# Patient Record
Sex: MALE | Race: WHITE | Employment: OTHER | URBAN - METROPOLITAN AREA
[De-identification: names, ages, dates, MRNs, and addresses within clinical notes are randomized per-mention and may not be internally consistent; named-entity substitution may affect disease eponyms.]

---

## 2018-01-01 ENCOUNTER — HOSPITAL ENCOUNTER (INPATIENT)
Age: 78
LOS: 1 days | Discharge: HOME | DRG: 292 | End: 2018-11-22
Attending: INTERNAL MEDICINE | Admitting: INTERNAL MEDICINE
Payer: MEDICARE

## 2018-01-01 ENCOUNTER — APPOINTMENT (OUTPATIENT)
Dept: GENERAL RADIOLOGY | Age: 78
DRG: 292 | End: 2018-01-01
Attending: INTERNAL MEDICINE
Payer: MEDICARE

## 2018-01-01 ENCOUNTER — HOSPITAL ENCOUNTER (OUTPATIENT)
Dept: LAB | Age: 78
Discharge: HOME | End: 2018-11-28
Payer: MEDICARE

## 2018-01-01 VITALS
DIASTOLIC BLOOD PRESSURE: 74 MMHG | WEIGHT: 168.2 LBS | HEART RATE: 99 BPM | RESPIRATION RATE: 16 BRPM | BODY MASS INDEX: 23.46 KG/M2 | SYSTOLIC BLOOD PRESSURE: 125 MMHG | TEMPERATURE: 98.7 F | OXYGEN SATURATION: 95 %

## 2018-01-01 DIAGNOSIS — I50.23 ACUTE ON CHRONIC SYSTOLIC HEART FAILURE (HCC): ICD-10-CM

## 2018-01-01 LAB
ALBUMIN SERPL-MCNC: 3.8 G/DL (ref 3.2–4.6)
ALBUMIN/GLOB SERPL: 1 {RATIO} (ref 1.2–3.5)
ALP SERPL-CCNC: 128 U/L (ref 50–136)
ALT SERPL-CCNC: 29 U/L (ref 12–65)
ANION GAP SERPL CALC-SCNC: 5 MMOL/L
ANION GAP SERPL CALC-SCNC: 6 MMOL/L (ref 7–16)
ANION GAP SERPL CALC-SCNC: 8 MMOL/L (ref 7–16)
AST SERPL-CCNC: 41 U/L (ref 15–37)
ATRIAL RATE: 182 BPM
BILIRUB SERPL-MCNC: 1.7 MG/DL (ref 0.2–1.1)
BNP SERPL-MCNC: 261 PG/ML
BUN SERPL-MCNC: 17 MG/DL (ref 8–23)
BUN SERPL-MCNC: 17 MG/DL (ref 8–23)
BUN SERPL-MCNC: 18 MG/DL (ref 8–23)
CALCIUM SERPL-MCNC: 8.3 MG/DL (ref 8.3–10.4)
CALCIUM SERPL-MCNC: 8.8 MG/DL (ref 8.3–10.4)
CALCIUM SERPL-MCNC: 9 MG/DL (ref 8.3–10.4)
CALCULATED R AXIS, ECG10: 81 DEGREES
CALCULATED T AXIS, ECG11: 77 DEGREES
CHLORIDE SERPL-SCNC: 103 MMOL/L (ref 98–107)
CHLORIDE SERPL-SCNC: 104 MMOL/L (ref 98–107)
CHLORIDE SERPL-SCNC: 107 MMOL/L (ref 98–107)
CO2 SERPL-SCNC: 30 MMOL/L (ref 21–32)
CO2 SERPL-SCNC: 30 MMOL/L (ref 21–32)
CO2 SERPL-SCNC: 32 MMOL/L (ref 21–32)
CREAT SERPL-MCNC: 1.23 MG/DL (ref 0.8–1.5)
CREAT SERPL-MCNC: 1.28 MG/DL (ref 0.8–1.5)
CREAT SERPL-MCNC: 1.3 MG/DL (ref 0.8–1.5)
DIAGNOSIS, 93000: NORMAL
ERYTHROCYTE [DISTWIDTH] IN BLOOD BY AUTOMATED COUNT: 17.2 %
ERYTHROCYTE [DISTWIDTH] IN BLOOD BY AUTOMATED COUNT: 18.3 %
GLOBULIN SER CALC-MCNC: 4 G/DL (ref 2.3–3.5)
GLUCOSE SERPL-MCNC: 103 MG/DL (ref 65–100)
GLUCOSE SERPL-MCNC: 105 MG/DL (ref 65–100)
GLUCOSE SERPL-MCNC: 94 MG/DL (ref 65–100)
HCT VFR BLD AUTO: 45.8 % (ref 41.1–50.3)
HCT VFR BLD AUTO: 51.6 % (ref 41.1–50.3)
HGB BLD-MCNC: 14.4 G/DL (ref 13.6–17.2)
HGB BLD-MCNC: 16.5 G/DL (ref 13.6–17.2)
INR PPP: 3.8
INR PPP: 3.9
MAGNESIUM SERPL-MCNC: 2 MG/DL (ref 1.8–2.4)
MAGNESIUM SERPL-MCNC: 2.1 MG/DL (ref 1.8–2.4)
MAGNESIUM SERPL-MCNC: 2.2 MG/DL (ref 1.8–2.4)
MCH RBC QN AUTO: 27.6 PG (ref 26.1–32.9)
MCH RBC QN AUTO: 28.3 PG (ref 26.1–32.9)
MCHC RBC AUTO-ENTMCNC: 31.4 G/DL (ref 31.4–35)
MCHC RBC AUTO-ENTMCNC: 32 G/DL (ref 31.4–35)
MCV RBC AUTO: 87.9 FL (ref 79.6–97.8)
MCV RBC AUTO: 88.4 FL (ref 79.6–97.8)
NRBC # BLD: 0 K/UL (ref 0–0.2)
NRBC # BLD: 0 K/UL (ref 0–0.2)
PLATELET # BLD AUTO: 107 K/UL (ref 150–450)
PLATELET # BLD AUTO: 131 K/UL (ref 150–450)
PMV BLD AUTO: 9.8 FL (ref 9.4–12.3)
PMV BLD AUTO: 9.9 FL (ref 9.4–12.3)
POTASSIUM SERPL-SCNC: 3.7 MMOL/L (ref 3.5–5.1)
POTASSIUM SERPL-SCNC: 3.9 MMOL/L (ref 3.5–5.1)
POTASSIUM SERPL-SCNC: 4.5 MMOL/L (ref 3.5–5.1)
PROT SERPL-MCNC: 7.8 G/DL (ref 6.3–8.2)
PROTHROMBIN TIME: 35.7 SEC (ref 11.5–14.5)
PROTHROMBIN TIME: 36.3 SEC (ref 11.5–14.5)
Q-T INTERVAL, ECG07: 420 MS
QRS DURATION, ECG06: 100 MS
QTC CALCULATION (BEZET), ECG08: 502 MS
RBC # BLD AUTO: 5.21 M/UL (ref 4.23–5.6)
RBC # BLD AUTO: 5.84 M/UL (ref 4.23–5.6)
SODIUM SERPL-SCNC: 140 MMOL/L (ref 136–145)
SODIUM SERPL-SCNC: 142 MMOL/L (ref 136–145)
SODIUM SERPL-SCNC: 143 MMOL/L (ref 136–145)
TROPONIN I SERPL-MCNC: 0.02 NG/ML (ref 0.02–0.05)
VENTRICULAR RATE, ECG03: 86 BPM
WBC # BLD AUTO: 5 K/UL (ref 4.3–11.1)
WBC # BLD AUTO: 5.2 K/UL (ref 4.3–11.1)

## 2018-01-01 PROCEDURE — 74011250637 HC RX REV CODE- 250/637: Performed by: INTERNAL MEDICINE

## 2018-01-01 PROCEDURE — 65660000000 HC RM CCU STEPDOWN

## 2018-01-01 PROCEDURE — 74011250637 HC RX REV CODE- 250/637: Performed by: NURSE PRACTITIONER

## 2018-01-01 PROCEDURE — 74011250636 HC RX REV CODE- 250/636: Performed by: NURSE PRACTITIONER

## 2018-01-01 PROCEDURE — 85027 COMPLETE CBC AUTOMATED: CPT

## 2018-01-01 PROCEDURE — 71045 X-RAY EXAM CHEST 1 VIEW: CPT

## 2018-01-01 PROCEDURE — 83880 ASSAY OF NATRIURETIC PEPTIDE: CPT

## 2018-01-01 PROCEDURE — 83735 ASSAY OF MAGNESIUM: CPT

## 2018-01-01 PROCEDURE — 80053 COMPREHEN METABOLIC PANEL: CPT

## 2018-01-01 PROCEDURE — 85610 PROTHROMBIN TIME: CPT

## 2018-01-01 PROCEDURE — 36415 COLL VENOUS BLD VENIPUNCTURE: CPT

## 2018-01-01 PROCEDURE — 80048 BASIC METABOLIC PNL TOTAL CA: CPT

## 2018-01-01 PROCEDURE — 77010033678 HC OXYGEN DAILY

## 2018-01-01 PROCEDURE — 94760 N-INVAS EAR/PLS OXIMETRY 1: CPT

## 2018-01-01 PROCEDURE — 84484 ASSAY OF TROPONIN QUANT: CPT

## 2018-01-01 PROCEDURE — 93005 ELECTROCARDIOGRAM TRACING: CPT | Performed by: NURSE PRACTITIONER

## 2018-01-01 RX ORDER — FUROSEMIDE 40 MG/1
40 TABLET ORAL DAILY
Status: DISCONTINUED | OUTPATIENT
Start: 2018-01-01 | End: 2018-01-01 | Stop reason: HOSPADM

## 2018-01-01 RX ORDER — WARFARIN SODIUM 5 MG/1
5 TABLET ORAL
Status: DISCONTINUED | OUTPATIENT
Start: 2018-01-01 | End: 2018-01-01

## 2018-01-01 RX ORDER — LOSARTAN POTASSIUM 50 MG/1
100 TABLET ORAL DAILY
Status: DISCONTINUED | OUTPATIENT
Start: 2018-01-01 | End: 2018-01-01 | Stop reason: HOSPADM

## 2018-01-01 RX ORDER — WARFARIN 2.5 MG/1
2.5 TABLET ORAL ONCE
Status: COMPLETED | OUTPATIENT
Start: 2018-01-01 | End: 2018-01-01

## 2018-01-01 RX ORDER — SODIUM CHLORIDE 0.9 % (FLUSH) 0.9 %
5-10 SYRINGE (ML) INJECTION EVERY 8 HOURS
Status: DISCONTINUED | OUTPATIENT
Start: 2018-01-01 | End: 2018-01-01 | Stop reason: HOSPADM

## 2018-01-01 RX ORDER — FUROSEMIDE 10 MG/ML
40 INJECTION INTRAMUSCULAR; INTRAVENOUS 2 TIMES DAILY
Status: DISCONTINUED | OUTPATIENT
Start: 2018-01-01 | End: 2018-01-01

## 2018-01-01 RX ORDER — ACETAMINOPHEN 500 MG
500 TABLET ORAL
Status: DISCONTINUED | OUTPATIENT
Start: 2018-01-01 | End: 2018-01-01 | Stop reason: HOSPADM

## 2018-01-01 RX ORDER — ALBUTEROL SULFATE 0.83 MG/ML
1.25 SOLUTION RESPIRATORY (INHALATION)
Status: DISCONTINUED | OUTPATIENT
Start: 2018-01-01 | End: 2018-01-01 | Stop reason: HOSPADM

## 2018-01-01 RX ORDER — FUROSEMIDE 40 MG/1
40 TABLET ORAL DAILY
Qty: 30 TAB | Refills: 11 | Status: SHIPPED | OUTPATIENT
Start: 2018-01-01 | End: 2019-01-01

## 2018-01-01 RX ORDER — WARFARIN SODIUM 5 MG/1
5 TABLET ORAL EVERY EVENING
Status: DISCONTINUED | OUTPATIENT
Start: 2018-01-01 | End: 2018-01-01

## 2018-01-01 RX ORDER — ATORVASTATIN CALCIUM 40 MG/1
40 TABLET, FILM COATED ORAL DAILY
Status: DISCONTINUED | OUTPATIENT
Start: 2018-01-01 | End: 2018-01-01 | Stop reason: HOSPADM

## 2018-01-01 RX ORDER — ONDANSETRON 2 MG/ML
4 INJECTION INTRAMUSCULAR; INTRAVENOUS
Status: DISCONTINUED | OUTPATIENT
Start: 2018-01-01 | End: 2018-01-01 | Stop reason: HOSPADM

## 2018-01-01 RX ORDER — SODIUM CHLORIDE 0.9 % (FLUSH) 0.9 %
5-10 SYRINGE (ML) INJECTION AS NEEDED
Status: DISCONTINUED | OUTPATIENT
Start: 2018-01-01 | End: 2018-01-01 | Stop reason: HOSPADM

## 2018-01-01 RX ORDER — CARVEDILOL 6.25 MG/1
6.25 TABLET ORAL 2 TIMES DAILY WITH MEALS
Qty: 60 TAB | Refills: 11 | Status: SHIPPED | OUTPATIENT
Start: 2018-01-01 | End: 2019-01-01 | Stop reason: ALTCHOICE

## 2018-01-01 RX ORDER — NITROGLYCERIN 0.4 MG/1
0.4 TABLET SUBLINGUAL
Status: DISCONTINUED | OUTPATIENT
Start: 2018-01-01 | End: 2018-01-01 | Stop reason: HOSPADM

## 2018-01-01 RX ORDER — CARVEDILOL 6.25 MG/1
6.25 TABLET ORAL 2 TIMES DAILY WITH MEALS
Status: DISCONTINUED | OUTPATIENT
Start: 2018-01-01 | End: 2018-01-01 | Stop reason: HOSPADM

## 2018-01-01 RX ORDER — DILTIAZEM HYDROCHLORIDE 120 MG/1
120 CAPSULE, COATED, EXTENDED RELEASE ORAL DAILY
Status: DISCONTINUED | OUTPATIENT
Start: 2018-01-01 | End: 2018-01-01

## 2018-01-01 RX ADMIN — FUROSEMIDE 40 MG: 10 INJECTION, SOLUTION INTRAMUSCULAR; INTRAVENOUS at 16:13

## 2018-01-01 RX ADMIN — Medication 10 ML: at 16:17

## 2018-01-01 RX ADMIN — ATORVASTATIN CALCIUM 40 MG: 40 TABLET, FILM COATED ORAL at 08:42

## 2018-01-01 RX ADMIN — Medication 10 ML: at 21:08

## 2018-01-01 RX ADMIN — FUROSEMIDE 40 MG: 40 TABLET ORAL at 08:42

## 2018-01-01 RX ADMIN — CARVEDILOL 6.25 MG: 6.25 TABLET, FILM COATED ORAL at 08:42

## 2018-01-01 RX ADMIN — Medication 10 ML: at 06:19

## 2018-01-01 RX ADMIN — ONDANSETRON HYDROCHLORIDE 4 MG: 2 INJECTION, SOLUTION INTRAMUSCULAR; INTRAVENOUS at 01:50

## 2018-01-01 RX ADMIN — WARFARIN SODIUM 2.5 MG: 2.5 TABLET ORAL at 17:26

## 2018-01-01 RX ADMIN — LOSARTAN POTASSIUM 100 MG: 50 TABLET ORAL at 08:42

## 2018-01-01 RX ADMIN — CARVEDILOL 6.25 MG: 6.25 TABLET, FILM COATED ORAL at 17:26

## 2018-05-10 PROBLEM — Z79.01 LONG TERM (CURRENT) USE OF ANTICOAGULANTS: Status: ACTIVE | Noted: 2018-05-10

## 2018-11-21 PROBLEM — I50.9 HEART FAILURE (HCC): Status: ACTIVE | Noted: 2018-01-01

## 2018-11-21 PROBLEM — I50.23 SYSTOLIC CHF, ACUTE ON CHRONIC (HCC): Status: ACTIVE | Noted: 2018-01-01

## 2018-11-21 NOTE — PROGRESS NOTES
made initial visit. Pt was alert and verbal appearing comfortable with no pain level expressed or observed. Pt's wife and son were present.  welcomed them to DT and shared information about  services.  provided spiritual care through presence, pastoral conversation, and assurance of prayer.

## 2018-11-21 NOTE — PROGRESS NOTES
Patient received to room 323 as direct admit. Patient oriented to room, call light and plan of care. Admission assessment completed. Admission skin assessment completed with second RN and reveals the following: no breakdown noted.  Pitting edema BLE

## 2018-11-21 NOTE — PROGRESS NOTES
INR 3.8. Order received from Latrice Hair NP to give 2.5 Coumadin tonight. And to base daily Coumadin dosing on the daily INRs.

## 2018-11-21 NOTE — PROGRESS NOTES
Agree with admission skin assessment as documented by Aidan Ambriz, patient's primary RN-- No breakdown noted.  Pitting edema BLE

## 2018-11-21 NOTE — PROGRESS NOTES
Warfarin dosing per pharmacist 
 
Taylor Hernandez is a 69 y.o. male. Weight: 79 kg (174 lb 1.6 oz) Indication:  Afib, hx of CVA, s/p bio MVR Goal INR:  2.5-3.5 Home dose:  2.5 mg on Thursdays; 5 mg all other days Risk factors or significant drug interactions:  none Other anticoagulants:  none Daily Monitoring Date  INR     Warfarin dose HGB              Notes 11/20  5  Held  --- 
11/21  3.8  2.5 mg  16.5 Pharmacy consulted to dose warfarin for Mr. Lisa Ontiveros during this admission. He presented to the anticoag clinic yesterday with supratherapeutic INR after taking an antibiotic prior to a dental procedure. His dose was held yesterday and his INR has dropped to 3.8. Spoke with Ollie Key NP and will give 2.5 mg tonight with further dosing adjustments based on daily INR. Pharmacy will continue to follow. Please call with any questions. Thank you, Jm Monsalve, PharmD Clinical Pharmacist 
[phone]

## 2018-11-21 NOTE — H&P
[address] Cardiology History & Physical  
  
Date of  Admission: 11/21/2018  1:01 PM  
 
Primary Care Physician: Dr. Elsa Barreto Primary Cardiologist: Dr. Ming Tucker Admitting Physician: Dr. Ming Tucker CC: CHF and A. Fib/flutter HPI:  Fidel Morrow is a 69 y.o. male with prior h/o PAF likely permanent now with prior h/o failed DCCV and intolerant to amiodarone d/t severe fatigue. Additional h/o MV disease s/p bio MVR, flutter s/p ablation, HTN, HLP, PVD s/p distal aorto-bifem endostents (sees [facility]), CVA on coumadin, lung cancer s/p lobectomy, CAD s/p CABG and PCI to LAD, LCx and RCA. INR checked yesterday in our office at noted at 5.0. Last echo 10/18 showed severe LV dysfunction EF  20-25% with global hypokinesis, severe LA size. Patient called today for irregular heart beat, unable to lie flat and SOB for the past several days. Patient saw Dr. Renetta Coelho today and MD called Dr. Ming Tucker. Patient is a direct admit for heart failure and A. fib/flutter. Past Medical History:  
Diagnosis Date  A-fib ([address] 2/9/2016  Aneurysm ([facility] [address] 2006 AAA repaired  Arrhythmia hx-- a-fib--flutter---- ablation 2010---\"paroxysmal atrial fib\" per note 7/13- lifelong Coumadin  Atrial flutter ([address]  CAD (coronary artery disease) 1999- stents x6; 2010 bioprosthetic MVR; last stent in 2005  CAD (coronary artery disease) EF=54% on echo 6/25/13  Calculus of kidney 2/14/2014  Calculus of ureter 2/14/2014  Cancer ([facility] [address] 2005  
 right lung; surg- chemo x 4 months  Cancer ([address] 8/26/13  
 prostate-   
 Dyslipidemia   
 med controlled  Elevated prostate specific antigen (PSA) 2/14/2014  Hypercholesteremia  Hypertension   
 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  
 confused since last anesthesia for brachytherapy (13)- pt reports at preassessment he cannot remember anything since then- gave poor history, not remembering name of family member  Unstable angina ([facility] [address] 2010 Past Surgical History:  
Procedure Laterality Date  CARDIAC SURG PROCEDURE UNLIST  2010 CABG- valve replaced  CARDIAC SURG PROCEDURE UNLIST    
 stents x6  
 CHEST SURGERY PROCEDURE UNLISTED    
 right lobectomy for CA  
 HX HERNIA REPAIR    
 HX UROLOGICAL  13  
 brachytherapy/ prostate  HX UROLOGICAL    
 prostate U/S and BX  VASCULAR SURGERY PROCEDURE UNLIST    
 AAA repair Allergies Allergen Reactions  Iodinated Contrast- Oral And Iv Dye Hives  Prednisone Other (comments) CHEST PAIN  
 Statins-Hmg-Coa Reductase Inhibitors Other (comments) Only with some statins - myalgias and mildly elevated CK Social History Socioeconomic History  Marital status:  Spouse name: Not on file  Number of children: Not on file  Years of education: Not on file  Highest education level: Not on file Social Needs  Financial resource strain: Not on file  Food insecurity - worry: Not on file  Food insecurity - inability: Not on file  Transportation needs - medical: Not on file  Transportation needs - non-medical: Not on file Occupational History  Not on file Tobacco Use  Smoking status: Former Smoker Packs/day: 1.00 Years: 50.00 Pack years: 50.00 Types: Cigarettes Last attempt to quit: 2010 Years since quittin.6  Smokeless tobacco: Never Used Substance and Sexual Activity  Alcohol use: No  
  Alcohol/week: 0.0 oz  Drug use: No  
 Sexual activity: Not on file Other Topics Concern  Not on file Social History Narrative Lives with girlfriend. Family History Problem Relation Age of Onset  Lung Disease Father   
     black lung--  Heart Attack Mother  Hypertension Other   
     gen fam HX  Coronary Artery Disease Brother  Coronary Artery Disease Brother  Coronary Artery Disease Brother No current facility-administered medications for this encounter. Review of Systems Review of Systems Constitution: Negative for diaphoresis, weakness and malaise/fatigue. HENT: Negative for congestion. Cardiovascular: Negative for chest pain, claudication, cyanosis, dyspnea on exertion, irregular heartbeat, leg swelling, near-syncope, orthopnea, palpitations, paroxysmal nocturnal dyspnea and syncope. Respiratory: Negative for cough, shortness of breath and wheezing. Endocrine: Negative for cold intolerance and heat intolerance. Hematologic/Lymphatic: Does not bruise/bleed easily. Skin: Negative for nail changes. Neurological: Negative for dizziness and headaches. Subjective:  
 
Visit Vitals /88 (BP 1 Location: Right arm, BP Patient Position: At rest;Sitting) Pulse (!) 105 Temp 97.5 °F (36.4 °C) Resp 18 Wt 79 kg (174 lb 1.6 oz) SpO2 93% BMI 24.28 kg/m² No intake/output data recorded. No intake/output data recorded. Physical Exam: 
General: Well Developed, Well Nourished, No Acute Distress HEENT: pupils equal and round, no abnormalities noted Neck: supple, no JVD, no carotid bruits Heart: S1S2 with RRR without murmurs or gallops Lungs: Clear throughout auscultation bilaterally without adventitious sounds Abd: soft, nontender, nondistended, with good bowel sounds Ext: warm, no edema, calves supple/nontender, pulses 2+ bilaterally Skin: warm and dry Psychiatric: Normal mood and affect Neurologic: Alert and oriented X 3 Cardiographics Telemetry: A> fib 90s ECG: ordered Echocardiogram: 10/18 showed EF 20-25% with global hypokinesis, severe LA size. Labs: ordered Patient has been seen and examined by Dr. Joshua Kern and he agrees with the following assessment and plan: 
 
 Assessment/Plan:  
  
 Principal Problem: 
  Systolic CHF, acute on chronic ([address] (11/21/2018)- DA for IV lasix with daily labs. Stat labs on arrival along with EKG. Continue ARB. Intolerant to Toprol in past.   
 
Active Problems: 
  CAD (coronary artery disease) (7/20/2010)- no active angina sx; continue home meds. A-fib ([address] (2/9/2016)- stat INR pending with yesterday's INR of 5.0. Will continue home meds. Overview: S/p ablation History of CVA (cerebrovascular accident) (2/9/2016)- on coumadin and statin S/P mitral valve replacement with bioprosthetic valve (2/26/2016)- on coumadin; INR pending. Leta Wilkinson NP 
11/21/2018 1:32 PM 
 
ATTENDING ADDENDUM: 
 
Patient seen and examined by me. Agree with above note by physician extender. Key findings are:  No CP or palpitations, but worsening CHF symptoms and rapid AF in Dr. Tompkins Loud office today. Rate 80-90s lying in bed at present. No angina but EF down from 55% in 2016 to 20-25% by echo several weeks ago. Mild LE edema and coarse bibasilarly, but doesn't appear markedly volume overloaded on exam, although reports several days of orthopnea, PND, and OROZCO consistent with acute on chronic systolic CHF symptoms. CV- RRR without murmur, no S3, JVD 8-9cm 45 deg Lungs- Clear bilaterally apically, coarse bibasilar Abd- soft, nontender, nondistended Ext- trace pitting LE edema Plan: As above. IV diuresis, increase rate slowing meds for AF and watch INR on chronic coumadin. EF newly diagnosed as low and now with clinically his first CHF symptoms prompting admission in a few years.  Will probably need a LHC with possible PCI given newly decreased EF but will treat HF and maximize AF rate control and consider outpatient cath after holding coumadin for a few days (vs. LHC over weekend if doesn't improve rapidly from CHF standpoint in next day or two, defer to weekend MD's on call). Stop cardizem and convert to coreg for CHF (intolerant to toprol XL recently due to GI upset). Already on max dose losartan. Titrate coreg as tolerated. Jan Delgado MD 
[facility] Cardiology Pager [phone]

## 2018-11-22 NOTE — PROGRESS NOTES
Care Management Interventions PCP Verified by CM: Yojana Ramsey MD) Mode of Transport at Discharge: (family) Transition of Care Consult (CM Consult): Discharge Planning(Pt is insured by [facility] with pharmacy benefits.) Discharge Durable Medical Equipment: Yes(Home O2 at 3 liters ordered from [facility]) Physical Therapy Consult: No 
Occupational Therapy Consult: No 
Speech Therapy Consult: No 
Current Support Network: Lives with Spouse Confirm Follow Up Transport: Family Plan discussed with Pt/Family/Caregiver: Yes Freedom of Choice Offered: Yes Discharge Location Discharge Placement: Home(Home with new home O2 set up as ordered.)

## 2018-11-22 NOTE — PROGRESS NOTES
PCT called this nurse to bedside- patient O2 sat 83% on room air. Patient sleeping soundly, easily arousable, asymptomatic. Patient placed on 2L/NC. Patient O2 sat 93%. Vadim Roy NP notified. No new orders at this time. Will continue to monitor.

## 2018-11-22 NOTE — DISCHARGE INSTRUCTIONS
[address] [place] [address] for oxygen     DISCHARGE SUMMARY from Nurse    PATIENT INSTRUCTIONS:    After general anesthesia or intravenous sedation, for 24 hours or while taking prescription Narcotics:  · Limit your activities  · Do not drive and operate hazardous machinery  · Do not make important personal or business decisions  · Do  not drink alcoholic beverages  · If you have not urinated within 8 hours after discharge, please contact your surgeon on call. Report the following to your surgeon:  · Excessive pain, swelling, redness or odor of or around the surgical area  · Temperature over 100.5  · Nausea and vomiting lasting longer than 4 hours or if unable to take medications  · Any signs of decreased circulation or nerve impairment to extremity: change in color, persistent  numbness, tingling, coldness or increase pain  · Any questions    What to do at Home:  Recommended activity: Activity as tolerated    If you experience any of the following symptoms chest pain, shortness of breath, weight gain of 3 pounds overnight or 5 pounds in a week please follow up with [facility]. *  Please give a list of your current medications to your Primary Care Provider. *  Please update this list whenever your medications are discontinued, doses are      changed, or new medications (including over-the-counter products) are added. *  Please carry medication information at all times in case of emergency situations. These are general instructions for a healthy lifestyle:    No smoking/ No tobacco products/ Avoid exposure to second hand smoke  Surgeon General's Warning:  Quitting smoking now greatly reduces serious risk to your health.     Obesity, smoking, and sedentary lifestyle greatly increases your risk for illness    A healthy diet, regular physical exercise & weight monitoring are important for maintaining a healthy lifestyle    You may be retaining fluid if you have a history of heart failure or if you experience any of the following symptoms:  Weight gain of 3 pounds or more overnight or 5 pounds in a week, increased swelling in our hands or feet or shortness of breath while lying flat in bed. Please call your doctor as soon as you notice any of these symptoms; do not wait until your next office visit. Recognize signs and symptoms of STROKE:    F-face looks uneven    A-arms unable to move or move unevenly    S-speech slurred or non-existent    T-time-call 911 as soon as signs and symptoms begin-DO NOT go       Back to bed or wait to see if you get better-TIME IS BRAIN. Warning Signs of HEART ATTACK     Call 911 if you have these symptoms:   Chest discomfort. Most heart attacks involve discomfort in the center of the chest that lasts more than a few minutes, or that goes away and comes back. It can feel like uncomfortable pressure, squeezing, fullness, or pain.  Discomfort in other areas of the upper body. Symptoms can include pain or discomfort in one or both arms, the back, neck, jaw, or stomach.  Shortness of breath with or without chest discomfort.  Other signs may include breaking out in a cold sweat, nausea, or lightheadedness. Don't wait more than five minutes to call 911 - MINUTES MATTER! Fast action can save your life. Calling 911 is almost always the fastest way to get lifesaving treatment. Emergency Medical Services staff can begin treatment when they arrive -- up to an hour sooner than if someone gets to the hospital by car. The discharge information has been reviewed with the patient. The patient verbalized understanding. Discharge medications reviewed with the patient and appropriate educational materials and side effects teaching were provided.   ___________________________________________________________________________________________________________________________________     Heart Failure: Care Instructions  Your Care Instructions    Heart failure occurs when your heart does not pump as much blood as the body needs. Failure does not mean that the heart has stopped pumping but rather that it is not pumping as well as it should. Over time, this causes fluid buildup in your lungs and other parts of your body. Fluid buildup can cause shortness of breath, fatigue, swollen ankles, and other problems. By taking medicines regularly, reducing sodium (salt) in your diet, checking your weight every day, and making lifestyle changes, you can feel better and live longer. Follow-up care is a key part of your treatment and safety. Be sure to make and go to all appointments, and call your doctor if you are having problems. It's also a good idea to know your test results and keep a list of the medicines you take. How can you care for yourself at home? Medicines    · Be safe with medicines. Take your medicines exactly as prescribed. Call your doctor if you think you are having a problem with your medicine.     · Do not take any vitamins, over-the-counter medicine, or herbal products without talking to your doctor first. Mayra Conley not take ibuprofen (Advil or Motrin) and naproxen (Aleve) without talking to your doctor first. They could make your heart failure worse.     · You may be taking some of the following medicine. ? Beta-blockers can slow heart rate, decrease blood pressure, and improve your condition. Taking a beta-blocker may lower your chance of needing to be hospitalized. ? Angiotensin-converting enzyme inhibitors (ACEIs) reduce the heart's workload, lower blood pressure, and reduce swelling. Taking an ACEI may lower your chance of needing to be hospitalized again. ? Angiotensin II receptor blockers (ARBs) work like ACEIs. Your doctor may prescribe them instead of ACEIs. ? Diuretics, also called water pills, reduce swelling. ? Potassium supplements replace this important mineral, which is sometimes lost with diuretics.   ? Aspirin and other blood thinners prevent blood clots, which can cause a stroke or heart attack.    You will get more details on the specific medicines your doctor prescribes. Diet    · Your doctor may suggest that you limit sodium to 2,000 milligrams (mg) a day or less. That is less than 1 teaspoon of salt a day, including all the salt you eat in cooking or in packaged foods. People get most of their sodium from processed foods. Fast food and restaurant meals also tend to be very high in sodium.     · Ask your doctor how much liquid you can drink each day. You may have to limit liquids.    Weight    · Weigh yourself without clothing at the same time each day. Record your weight. Call your doctor if you have a sudden weight gain, such as more than 2 to 3 pounds in a day or 5 pounds in a week. (Your doctor may suggest a different range of weight gain.) A sudden weight gain may mean that your heart failure is getting worse.    Activity level    · Start light exercise (if your doctor says it is okay). Even if you can only do a small amount, exercise will help you get stronger, have more energy, and manage your weight and your stress. Walking is an easy way to get exercise. Start out by walking a little more than you did before. Bit by bit, increase the amount you walk.     · When you exercise, watch for signs that your heart is working too hard. You are pushing yourself too hard if you cannot talk while you are exercising. If you become short of breath or dizzy or have chest pain, stop, sit down, and rest.     · If you feel \"wiped out\" the day after you exercise, walk slower or for a shorter distance until you can work up to a better pace.     · Get enough rest at night. Sleeping with 1 or 2 pillows under your upper body and head may help you breathe easier.    Lifestyle changes    · Do not smoke. Smoking can make a heart condition worse. If you need help quitting, talk to your doctor about stop-smoking programs and medicines. These can increase your chances of quitting for good.  Quitting smoking may be the most important step you can take to protect your heart.     · Limit alcohol to 2 drinks a day for men and 1 drink a day for women. Too much alcohol can cause health problems.     · Avoid getting sick from colds and the flu. Get a pneumococcal vaccine shot. If you have had one before, ask your doctor whether you need another dose. Get a flu shot each year. If you must be around people with colds or the flu, wash your hands often. When should you call for help? Call 911 if you have symptoms of sudden heart failure such as:    · You have severe trouble breathing.     · You cough up pink, foamy mucus.     · You have a new irregular or rapid heartbeat.    Call your doctor now or seek immediate medical care if:    · You have new or increased shortness of breath.     · You are dizzy or lightheaded, or you feel like you may faint.     · You have sudden weight gain, such as more than 2 to 3 pounds in a day or 5 pounds in a week. (Your doctor may suggest a different range of weight gain.)     · You have increased swelling in your legs, ankles, or feet.     · You are suddenly so tired or weak that you cannot do your usual activities.    Watch closely for changes in your health, and be sure to contact your doctor if you develop new symptoms. Where can you learn more? Go to http://evan-jerald.info/. Enter W705 in the search box to learn more about \"Heart Failure: Care Instructions. \"  Current as of: December 6, 2017  Content Version: 11.8  © 1305-4283 [employer]. Care instructions adapted under license by [employer] (which disclaims liability or warranty for this information). If you have questions about a medical condition or this instruction, always ask your healthcare professional. [place] [address] any warranty or liability for your use of this information.        Avoiding Triggers With Heart Failure: Care Instructions  Your Care Instructions    Triggers are anything that make your heart failure flare up. A flare-up is also called \"sudden heart failure\" or \"acute heart failure. \" When you have a flare-up, fluid builds up in your lungs, and you have problems breathing. You might need to go to the hospital. By watching for changes in your condition and avoiding triggers, you can prevent heart failure flare-ups. Follow-up care is a key part of your treatment and safety. Be sure to make and go to all appointments, and call your doctor if you are having problems. It's also a good idea to know your test results and keep a list of the medicines you take. How can you care for yourself at home? Watch for changes in your weight and condition  · Weigh yourself without clothing at the same time each day. Record your weight. Call your doctor if you have sudden weight gain, such as more than 2 to 3 pounds in a day or 5 pounds in a week. (Your doctor may suggest a different range of weight gain.) A sudden weight gain may mean that your heart failure is getting worse. · Keep a daily record of your symptoms. Write down any changes in how you feel, such as new shortness of breath, cough, or problems eating. Also record if your ankles are more swollen than usual and if you feel more tired than usual. Note anything that you ate or did that could have triggered these changes. Limit sodium  Sodium causes your body to hold on to extra water. This may cause your heart failure symptoms to get worse. People get most of their sodium from processed foods. Fast food and restaurant meals also tend to be very high in sodium. · Your doctor may suggest that you limit sodium to 2,000 milligrams (mg) a day or less. That is less than 1 teaspoon of salt a day, including all the salt you eat in cooking or in packaged foods. · Read food labels on cans and food packages. They tell you how much sodium you get in one serving. Check the serving size.  If you eat more than one serving, you are getting more sodium. · Be aware that sodium can come in forms other than salt, including monosodium glutamate (MSG), sodium citrate, and sodium bicarbonate (baking soda). MSG is often added to Asian food. You can sometimes ask for food without MSG or salt. · Slowly reducing salt will help you adjust to the taste. Take the salt shaker off the table. · Flavor your food with garlic, lemon juice, onion, vinegar, herbs, and spices instead of salt. Do not use soy sauce, steak sauce, onion salt, garlic salt, mustard, or ketchup on your food, unless it is labeled \"low-sodium\" or \"low-salt. \"  · Make your own salad dressings, sauces, and ketchup without adding salt. · Use fresh or frozen ingredients, instead of canned ones, whenever you can. Choose low-sodium canned goods. · Eat less processed food and food from restaurants, including fast food. Exercise as directed  Moderate, regular exercise is very good for your heart. It improves your blood flow and helps control your weight. But too much exercise can stress your heart and cause a heart failure flare-up. · Check with your doctor before you start an exercise program.  · Walking is an easy way to get exercise. Start out slowly. Gradually increase the length and pace of your walk. Swimming, riding a bike, and using a treadmill are also good forms of exercise. · When you exercise, watch for signs that your heart is working too hard. You are pushing yourself too hard if you cannot talk while you are exercising. If you become short of breath or dizzy or have chest pain, stop, sit down, and rest.  · Do not exercise when you do not feel well. Take medicines correctly  · Take your medicines exactly as prescribed. Call your doctor if you think you are having a problem with your medicine. · Make a list of all the medicines you take. Include those prescribed to you by other doctors and any over-the-counter medicines, vitamins, or supplements you take.  Take this list with you when you go to any doctor. · Take your medicines at the same time every day. It may help you to post a list of all the medicines you take every day and what time of day you take them. · Make taking your medicine as simple as you can. Plan times to take your medicines when you are doing other things, such as eating a meal or getting ready for bed. This will make it easier to remember to take your medicines. · Get organized. Use helpful tools, such as daily or weekly pill containers. When should you call for help? Call 911 if you have symptoms of sudden heart failure such as:    · You have severe trouble breathing.     · You cough up pink, foamy mucus.     · You have a new irregular or rapid heartbeat.    Call your doctor now or seek immediate medical care if:    · You have new or increased shortness of breath.     · You are dizzy or lightheaded, or you feel like you may faint.     · You have sudden weight gain, such as more than 2 to 3 pounds in a day or 5 pounds in a week. (Your doctor may suggest a different range of weight gain.)     · You have increased swelling in your legs, ankles, or feet.     · You are suddenly so tired or weak that you cannot do your usual activities.    Watch closely for changes in your health, and be sure to contact your doctor if you develop new symptoms. Where can you learn more? Go to http://evan-jerald.info/. Enter I680 in the search box to learn more about \"Avoiding Triggers With Heart Failure: Care Instructions. \"  Current as of: December 6, 2017  Content Version: 11.8  © 2106-7375 [employer]. Care instructions adapted under license by [employer] (which disclaims liability or warranty for this information).  If you have questions about a medical condition or this instruction, always ask your healthcare professional. [address] any warranty or liability for your use of this information.

## 2018-11-22 NOTE — PROGRESS NOTES
Bedside and Verbal shift change report given to self (oncoming nurse) by Juan F Stark RN (offgoing nurse). Report included the following information SBAR, Kardex, MAR and Recent Results.

## 2018-11-22 NOTE — DISCHARGE SUMMARY
See H and P and details from Dr. Macho Quarles. Patient seen by me this AM, Thanksgiving morning, and he strongly requested to be home with his family for the holiday. He diuresed well overnight, was walking room and halls and felt better. While not ideal, he was sent home today. He will call us as needed over the holiday weekend. Plan: 1. Dilt stopped, changed to coreg BID per rec of Dr. Macho Quarles (HR better today, controlled this AM) 2. Added PO lasix 40mg daily 3. Check BMP, Mg and BNP next week 4. TC-7 follow up in our office 5. Likely will need outpatient LHC in coming weeks. 6. Hold coumadin again tonight, resume tomorrow, INR check early next week in our office 7. Home on oxygen today (this is new for him), will wean as outpatient.   Plan for nurse visit in our office Mon or Tues to check oyxgen levels, BP etc.   
 
Naren Ibrahim D.O.

## 2018-11-22 NOTE — PROGRESS NOTES
Discharge instructions reviewed with patient. Prescriptions given for lasix and coreg and med info sheets provided for all new medications. Opportunity for questions provided. Patient voiced understanding of all discharge instructions. Patient's IVs and telemetry monitor removed from patient. Telemetry monitor returned to monitor room.

## 2018-11-22 NOTE — PROGRESS NOTES
[facility] CARDIOLOGY PROGRESS NOTE 
      
 
11/22/2018 8:20 AM 
 
Admit Date: 11/21/2018 Subjective:  
Wants to be home for Thanksgiving. No CP. Breathing better, no edema. Wants to be home. ROS: 
Cardiovascular:  As noted above Objective:  
  
Vitals:  
 11/22/18 9111 11/22/18 0525 11/22/18 0551 11/22/18 5772 BP: 130/82 128/89 Pulse: 89 78 Resp: 18 18 Temp: 97.5 °F (36.4 °C) 97.3 °F (36.3 °C) SpO2: 92% (!) 83% 93% 92% Weight:  76.3 kg (168 lb 3.2 oz) Physical Exam: 
General-No Acute Distress Neck- supple, no JVD 
CV- regular rate and rhythm no MRG Lung- clear bilaterally with dec BS in the bases Abd- soft, nontender, nondistended Ext- no edema bilaterally. Skin- warm and dry Data Review:  
Recent Labs  
  11/22/18 
0404 11/21/18 
1408  142  
K 3.7 4.5 MG 2.0 2.2 BUN 18 17 CREA 1.28 1.23  
* 94 WBC 5.0 5.2 HGB 14.4 16.5 HCT 45.8 51.6*  
* 131* INR 3.9* 3.8*  
TROIQ  --  0.02 Assessment/Plan:  
 
Principal Problem: 
 
Breathing better, wants to be home today for Thanksgiving. Drop in EF noted, will defer to Dr. Ricardo Alas for more work-up as outpatient. Plan for d/c home today on PO lasix. Changed to coreg. Hold coumadin tonight and back on tomorrow. TC-7 follow up next week. Labs next week as well. Home today not ideal, but given holiday we will get him home. He is stable for d/c.   
 
 
 
 
Deon Tomlin, DO 
11/22/2018 8:20 AM

## 2018-11-22 NOTE — PROGRESS NOTES
Warfarin dosing per pharmacist 
 
Joy Nails is a 69 y.o. male. Weight: 76.3 kg (168 lb 3.2 oz) Indication:  Afib, hx of CVA, s/p bio MVR Goal INR:  2.5-3.5 Home dose:  2.5 mg on Thursdays; 5 mg all other days Risk factors or significant drug interactions:  none Other anticoagulants:  none Daily Monitoring Date  INR     Warfarin dose HGB              Notes 11/20  5  Held  --- 
11/21  3.8  2.5 mg  16.5 
11/22  3.9  Hold  --- Pharmacy consulted to dose warfarin for Mr. Tony Crespo during this admission. He presented to the anticoag clinic yesterday with supratherapeutic INR after taking an antibiotic prior to a dental procedure. INR 3.9. Noted plans to discharge. Agree with holding tonight's dose. Please call with any questions. Thank you, Chaim Green, PharmD Clinical Pharmacist 
[phone]

## 2018-11-22 NOTE — PROGRESS NOTES
Bedside report given to Mouna Bailey RN 
 
Pt asking why he cant go home now. Wife called on phone and reports pt sometimes confused/forgetful r/t stroke 5 yrs ago. Pt encouraged to stay, plan of care reviewed again, and wife speaking to him on phone now.

## 2019-01-01 ENCOUNTER — HOSPITAL ENCOUNTER (EMERGENCY)
Age: 79
Discharge: HOME | End: 2019-03-06
Attending: EMERGENCY MEDICINE
Payer: MEDICARE

## 2019-01-01 ENCOUNTER — APPOINTMENT (OUTPATIENT)
Dept: GENERAL RADIOLOGY | Age: 79
End: 2019-01-01
Attending: EMERGENCY MEDICINE
Payer: MEDICARE

## 2019-01-01 VITALS
HEART RATE: 90 BPM | BODY MASS INDEX: 23.52 KG/M2 | SYSTOLIC BLOOD PRESSURE: 148 MMHG | OXYGEN SATURATION: 96 % | HEIGHT: 71 IN | WEIGHT: 168 LBS | TEMPERATURE: 98.2 F | RESPIRATION RATE: 16 BRPM | DIASTOLIC BLOOD PRESSURE: 78 MMHG

## 2019-01-01 DIAGNOSIS — R06.00 DYSPNEA, UNSPECIFIED TYPE: Primary | ICD-10-CM

## 2019-01-01 LAB
ALBUMIN SERPL-MCNC: 3.4 G/DL (ref 3.2–4.6)
ALBUMIN/GLOB SERPL: 1.1 {RATIO} (ref 1.2–3.5)
ALP SERPL-CCNC: 95 U/L (ref 50–136)
ALT SERPL-CCNC: 26 U/L (ref 12–65)
ANION GAP SERPL CALC-SCNC: 5 MMOL/L (ref 7–16)
AST SERPL-CCNC: 22 U/L (ref 15–37)
ATRIAL RATE: 102 BPM
BASOPHILS # BLD: 0 K/UL (ref 0–0.2)
BASOPHILS NFR BLD: 0 % (ref 0–2)
BILIRUB SERPL-MCNC: 1.6 MG/DL (ref 0.2–1.1)
BNP SERPL-MCNC: 146 PG/ML
BUN SERPL-MCNC: 18 MG/DL (ref 8–23)
CALCIUM SERPL-MCNC: 8.8 MG/DL (ref 8.3–10.4)
CALCULATED R AXIS, ECG10: 85 DEGREES
CALCULATED T AXIS, ECG11: -12 DEGREES
CHLORIDE SERPL-SCNC: 104 MMOL/L (ref 98–107)
CO2 SERPL-SCNC: 34 MMOL/L (ref 21–32)
CREAT SERPL-MCNC: 1.29 MG/DL (ref 0.8–1.5)
DIAGNOSIS, 93000: NORMAL
DIFFERENTIAL METHOD BLD: ABNORMAL
EOSINOPHIL # BLD: 0 K/UL (ref 0–0.8)
EOSINOPHIL NFR BLD: 1 % (ref 0.5–7.8)
ERYTHROCYTE [DISTWIDTH] IN BLOOD BY AUTOMATED COUNT: 18.8 % (ref 11.9–14.6)
GLOBULIN SER CALC-MCNC: 3.2 G/DL (ref 2.3–3.5)
GLUCOSE SERPL-MCNC: 148 MG/DL (ref 65–100)
HCT VFR BLD AUTO: 49 % (ref 41.1–50.3)
HGB BLD-MCNC: 15.5 G/DL (ref 13.6–17.2)
IMM GRANULOCYTES # BLD AUTO: 0 K/UL (ref 0–0.5)
IMM GRANULOCYTES NFR BLD AUTO: 0 % (ref 0–5)
LACTATE BLD-SCNC: 1.5 MMOL/L (ref 0.5–1.9)
LYMPHOCYTES # BLD: 1.2 K/UL (ref 0.5–4.6)
LYMPHOCYTES NFR BLD: 18 % (ref 13–44)
MCH RBC QN AUTO: 28.1 PG (ref 26.1–32.9)
MCHC RBC AUTO-ENTMCNC: 31.6 G/DL (ref 31.4–35)
MCV RBC AUTO: 88.8 FL (ref 79.6–97.8)
MONOCYTES # BLD: 0.5 K/UL (ref 0.1–1.3)
MONOCYTES NFR BLD: 8 % (ref 4–12)
NEUTS SEG # BLD: 5 K/UL (ref 1.7–8.2)
NEUTS SEG NFR BLD: 74 % (ref 43–78)
NRBC # BLD: 0 K/UL (ref 0–0.2)
PLATELET # BLD AUTO: 79 K/UL (ref 150–450)
PMV BLD AUTO: 10 FL (ref 9.4–12.3)
POTASSIUM SERPL-SCNC: 3.5 MMOL/L (ref 3.5–5.1)
PROT SERPL-MCNC: 6.6 G/DL (ref 6.3–8.2)
Q-T INTERVAL, ECG07: 326 MS
QRS DURATION, ECG06: 102 MS
QTC CALCULATION (BEZET), ECG08: 433 MS
RBC # BLD AUTO: 5.52 M/UL (ref 4.23–5.6)
SODIUM SERPL-SCNC: 143 MMOL/L (ref 136–145)
TROPONIN I SERPL-MCNC: 0.02 NG/ML (ref 0.02–0.05)
VENTRICULAR RATE, ECG03: 106 BPM
WBC # BLD AUTO: 6.8 K/UL (ref 4.3–11.1)

## 2019-01-01 PROCEDURE — 93005 ELECTROCARDIOGRAM TRACING: CPT | Performed by: EMERGENCY MEDICINE

## 2019-01-01 PROCEDURE — 96375 TX/PRO/DX INJ NEW DRUG ADDON: CPT | Performed by: EMERGENCY MEDICINE

## 2019-01-01 PROCEDURE — 74011000258 HC RX REV CODE- 258: Performed by: EMERGENCY MEDICINE

## 2019-01-01 PROCEDURE — 80053 COMPREHEN METABOLIC PANEL: CPT

## 2019-01-01 PROCEDURE — 96365 THER/PROPH/DIAG IV INF INIT: CPT | Performed by: EMERGENCY MEDICINE

## 2019-01-01 PROCEDURE — 83880 ASSAY OF NATRIURETIC PEPTIDE: CPT

## 2019-01-01 PROCEDURE — 99285 EMERGENCY DEPT VISIT HI MDM: CPT | Performed by: EMERGENCY MEDICINE

## 2019-01-01 PROCEDURE — 83605 ASSAY OF LACTIC ACID: CPT

## 2019-01-01 PROCEDURE — 84484 ASSAY OF TROPONIN QUANT: CPT

## 2019-01-01 PROCEDURE — 74011250636 HC RX REV CODE- 250/636: Performed by: EMERGENCY MEDICINE

## 2019-01-01 PROCEDURE — 71046 X-RAY EXAM CHEST 2 VIEWS: CPT

## 2019-01-01 PROCEDURE — 85025 COMPLETE CBC W/AUTO DIFF WBC: CPT

## 2019-01-01 RX ORDER — FUROSEMIDE 10 MG/ML
40 INJECTION INTRAMUSCULAR; INTRAVENOUS
Status: COMPLETED | OUTPATIENT
Start: 2019-01-01 | End: 2019-01-01

## 2019-01-01 RX ORDER — AZITHROMYCIN 250 MG/1
TABLET, FILM COATED ORAL
Qty: 6 TAB | Refills: 0 | Status: SHIPPED | OUTPATIENT
Start: 2019-01-01 | End: 2019-01-01

## 2019-01-01 RX ADMIN — FUROSEMIDE 40 MG: 10 INJECTION, SOLUTION INTRAMUSCULAR; INTRAVENOUS at 17:59

## 2019-01-01 RX ADMIN — CEFTRIAXONE SODIUM 1 G: 1 INJECTION, POWDER, FOR SOLUTION INTRAMUSCULAR; INTRAVENOUS at 17:59

## 2019-03-06 NOTE — DISCHARGE INSTRUCTIONS

## 2019-03-06 NOTE — ED PROVIDER NOTES
60-year-old white male history of COPD, CHF, mitral valve replacement, coronary artery disease and atrial fibrillation is having increasing shortness of breath and cough over the past couple weeks. No fever. No chest pain. Mild lower extremity swelling. Patient is being seen for a routine dilation by primary care today and he was found to have increased heart rate and O2 saturation in the 80s on room air. Patient reports that he is on oxygen at home and has been using it more frequently. The history is provided by the patient. Shortness of Breath   Associated symptoms include cough and leg swelling. Pertinent negatives include no fever, no headaches, no neck pain, no chest pain, no vomiting, no abdominal pain and no rash.         Past Medical History:   Diagnosis Date    A-fib [facility]) 2/9/2016    Aneurysm ([facility] [address] 2006    AAA repaired    Arrhythmia     hx-- a-fib--flutter---- ablation 2010---\"paroxysmal atrial fib\" per note 7/13- lifelong Coumadin    Atrial flutter ([address]     CAD (coronary artery disease)     1999- stents x6; 2010 bioprosthetic MVR; last stent in 2005    CAD (coronary artery disease)     EF=54% on echo 6/25/13    Calculus of kidney 2/14/2014    Calculus of ureter 2/14/2014    Cancer ([address] 2005    right lung; surg- chemo x 4 months    Cancer ([address] 8/26/13    prostate-     Dyslipidemia     med controlled    Elevated prostate specific antigen (PSA) 2/14/2014    Hypercholesteremia     Hypertension     controlled with meds    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    Malignant neoplasm of prostate ([address] 2/14/2014    Microscopic hematuria 2/14/2014    Mitral valve prolapse 7/20/2010    Poor historian     Renal insufficiency 7/27/2010    Unspecified adverse effect of anesthesia 8/26/13    confused since last anesthesia for brachytherapy (8/26/13)- pt reports at preassessment he cannot remember anything since then- gave poor history, not remembering name of family member    Unstable angina ([address] 2010       Past Surgical History:   Procedure Laterality Date    CARDIAC SURG PROCEDURE UNLIST  2010    CABG- valve replaced    CARDIAC SURG PROCEDURE UNLIST  2004    stents x6    CHEST SURGERY PROCEDURE UNLISTED      right lobectomy for CA    HX HERNIA REPAIR      HX UROLOGICAL  13    brachytherapy/ prostate    HX UROLOGICAL      prostate U/S and BX    VASCULAR SURGERY PROCEDURE UNLIST      AAA repair         Family History:   Problem Relation Age of Onset    Lung Disease Father         black lung--    Heart Attack Mother     Hypertension Other         gen fam HX    Coronary Artery Disease Brother     Coronary Artery Disease Brother     Coronary Artery Disease Brother        Social History     Socioeconomic History    Marital status:      Spouse name: Not on file    Number of children: Not on file    Years of education: Not on file    Highest education level: Not on file   Social Needs    Financial resource strain: Not on file    Food insecurity - worry: Not on file    Food insecurity - inability: Not on file   [employer] needs - medical: Not on file   [employer] needs - non-medical: Not on file   Occupational History    Not on file   Tobacco Use    Smoking status: Former Smoker     Packs/day: 1.00     Years: 50.00     Pack years: 50.00     Types: Cigarettes     Last attempt to quit: 2010     Years since quittin.8    Smokeless tobacco: Never Used   Substance and Sexual Activity    Alcohol use: No     Alcohol/week: 0.0 oz    Drug use: No    Sexual activity: Not on file   Other Topics Concern    Not on file   Social History Narrative    Lives with girlfriend. ALLERGIES: Iodinated contrast- oral and iv dye; Prednisone; and Statins-hmg-coa reductase inhibitors    Review of Systems   Constitutional: Negative for fever. Respiratory: Positive for cough and shortness of breath.     Cardiovascular: Positive for leg swelling. Negative for chest pain. Gastrointestinal: Negative for abdominal pain, nausea and vomiting. Genitourinary: Negative for difficulty urinating. Musculoskeletal: Negative for back pain and neck pain. Skin: Negative for rash. Neurological: Negative for headaches. Vitals:    03/06/19 1401 03/06/19 1619   BP: (!) 163/96 149/78   Pulse: (!) 115    Resp: 20 18   Temp: 97.6 °F (36.4 °C) 98.2 °F (36.8 °C)   SpO2: 92% 96%   Weight: 76.2 kg (168 lb)    Height: 5' 11\" (1.803 m)             Physical Exam   Constitutional: He is oriented to person, place, and time. He appears well-developed and well-nourished. No distress. HENT:   Head: Normocephalic and atraumatic. Mouth/Throat: Oropharynx is clear and moist.   Eyes: Conjunctivae are normal. Pupils are equal, round, and reactive to light. Neck: Normal range of motion. Neck supple. Cardiovascular: An irregularly irregular rhythm present. Tachycardia present. Pulmonary/Chest: Effort normal and breath sounds normal.   Abdominal: Soft. There is no tenderness. Musculoskeletal: Normal range of motion. Trace symmetric lower extremity edema   Neurological: He is alert and oriented to person, place, and time. Skin: Skin is warm and dry. Psychiatric: He has a normal mood and affect. His behavior is normal.   Vitals reviewed. MDM  Number of Diagnoses or Management Options  Dyspnea, unspecified type:   Diagnosis management comments: EKG shows atrial fibrillation with an occasional PVC. Initial rate was 106. Heart rate has improved without direct intervention. Chest x-ray she has edema versus developing consolidation inferior left perihilar region. Lab work shows normal white blood count, lactic acid and BNP only slightly elevated at 146. Patient was treated with Lasix. He has remained comfortable on supplemental oxygen. O2 saturation in the 90s. Heart rate in the 90s.   Did offer admission however patient would prefer to be treated as an outpatient. Does have a mixed picture in that his chest x-ray appears consistent with infiltrate however his lab work does not definitely support an infectious process. Will treat with antibiotics and have him follow up with cardiology for further outpatient workup for possible CHF exacerbation.        Amount and/or Complexity of Data Reviewed  Clinical lab tests: ordered and reviewed  Tests in the radiology section of CPT®: ordered and reviewed  Tests in the medicine section of CPT®: ordered and reviewed  Independent visualization of images, tracings, or specimens: yes    Risk of Complications, Morbidity, and/or Mortality  Presenting problems: moderate  Diagnostic procedures: moderate  Management options: moderate           Procedures

## 2019-03-06 NOTE — ED TRIAGE NOTES
Patient reports sob starting 1000 today, but has been intermittent for 2 weeks. Cough and congestion with yellow sputum.  Denies fever, chills, n/v/d.

## 2019-03-06 NOTE — ED NOTES
I have reviewed discharge instructions with the patient. The patient verbalized understanding. Patient left ED via Discharge Method: ambulatory to Home with wife. Opportunity for questions and clarification provided. Patient given 1 scripts. To continue your aftercare when you leave the hospital, you may receive an automated call from our care team to check in on how you are doing. This is a free service and part of our promise to provide the best care and service to meet your aftercare needs.  If you have questions, or wish to unsubscribe from this service please call [phone]. Thank you for Choosing our [facility].
